# Patient Record
Sex: MALE | URBAN - METROPOLITAN AREA
[De-identification: names, ages, dates, MRNs, and addresses within clinical notes are randomized per-mention and may not be internally consistent; named-entity substitution may affect disease eponyms.]

---

## 2017-01-09 DIAGNOSIS — L40.0 PLAQUE PSORIASIS: Primary | ICD-10-CM

## 2017-01-17 ENCOUNTER — TELEPHONE (OUTPATIENT)
Dept: DERMATOLOGY | Facility: CLINIC | Age: 32
End: 2017-01-17

## 2017-01-20 ENCOUNTER — ALLIED HEALTH/NURSE VISIT (OUTPATIENT)
Dept: DERMATOLOGY | Facility: CLINIC | Age: 32
End: 2017-01-20

## 2017-01-20 DIAGNOSIS — L40.0 PLAQUE PSORIASIS: Primary | ICD-10-CM

## 2017-01-20 NOTE — MR AVS SNAPSHOT
After Visit Summary   2017    Luis M Roth    MRN: [id number]           Patient Information     Date Of Birth          1985        Visit Information        Provider Department      2017 12:15 PM Nurse,  Dermatology [facility] D[facility]         Follow-ups after your visit        Your next 10 appointments already scheduled     2017 12:15 PM   Nurse Visit with  Dermatology Nurse   [facility] ([facility])    [address] [place] [address] [phone]            2017  2:00 PM   (Arrive by 1:45 PM)   Return Visit with Paige Calderón PA-C   [facility] ([facility])    [address] [place] [address] [phone]              Who to contact     Please call your clinic at [phone] to:    Ask questions about your health    Make or cancel appointments    Discuss your medicines    Learn about your test results    Speak to your doctor   If you have compliments or concerns about an experience at your clinic, or if you wish to file a complaint, please contact [facility] Patient Relations at [phone] or email us at [email or web address]         Additional Information About Your Visit        MyChart Information     Tellpet is an electronic gateway that provides easy, online access to your medical records. With Game Face Hockey, you can request a clinic appointment, read your test results, renew a prescription or communicate with your care team.     To sign up for Tellpet visit the website at [email or web address]   You will be asked to enter the access code listed below, as well as some personal information. Please follow the directions to create your username and password.     Your access code is: [email or web address]  Expires: 3/26/2017  6:30 AM     Your access code will  in 90 days. If you need help or a  new code, please contact your [facility] or call [phone] for assistance.        Care EveryWhere ID     This is your Care EveryWhere ID. This could be used by other organizations to access your [place] medical records  [id number]         Blood Pressure from Last 3 Encounters:   No data found for BP    Weight from Last 3 Encounters:   08/05/15 75.297 kg (166 lb)   06/11/14 71.85 kg (158 lb 6.4 oz)   04/18/13 69.673 kg (153 lb 9.6 oz)              Today, you had the following     No orders found for display       Primary Care Provider    None Specified       No primary provider on file.        Thank you!     Thank you for choosing [facility] DERMATOLOGY  for your care. Our goal is always to provide you with excellent care. Hearing back from our patients is one way we can continue to improve our services. Please take a few minutes to complete the written survey that you may receive in the mail after your visit with us. Thank you!             Your Updated Medication List - Protect others around you: Learn how to safely use, store and throw away your medicines at www.disposemymeds.org.          This list is accurate as of: 1/20/17 12:04 PM.  Always use your most recent med list.                   Brand Name Dispense Instructions for use    clobetasol 0.05 % ointment    TEMOVATE    60 g    Apply topically 2 times daily On weekends.       ustekinumab 45 MG/0.5ML Sosy    STELARA    0.5 mL    Inject 0.5 mLs (45 mg) Subcutaneous once for 1 dose

## 2017-01-20 NOTE — NURSING NOTE
Dermatology Rooming Note    Luis M Roth's goals for this visit include:   Chief Complaint   Patient presents with     Derm Problem     Psoriasis. Stelara injection     Elizabet Centeno, CMA

## 2017-04-20 ENCOUNTER — OFFICE VISIT (OUTPATIENT)
Dept: DERMATOLOGY | Facility: CLINIC | Age: 32
End: 2017-04-20

## 2017-04-20 DIAGNOSIS — L40.0 PLAQUE PSORIASIS: ICD-10-CM

## 2017-04-20 ASSESSMENT — PAIN SCALES - GENERAL: PAINLEVEL: NO PAIN (0)

## 2017-04-20 NOTE — LETTER
4/20/2017       RE: Luis M Roth  [address] [place] [address]     Dear Colleague,    Thank you for referring your patient, Luis M Roth, to the [facility] DERMATOLOGY at [facility]. Please see a copy of my visit note below.    [facility] Dermatology Note    Dermatology Problem List:  1. Plaque psoriasis  -s/p Stelara 45 mg  -s/p Clobetasol 0.05% ointment    CC:   Chief Complaint   Patient presents with     Derm Problem     Patient states he is here for an injection for stelera and a follow up on psoriasis.       Date of Service: Apr 20, 2017    History of Present Illness:  Mr. Luis M Roth is a 31 year old male who presents for a follow up for his psoriasis. The patient was last seen here January 2017.  He states he is doing well but does notice 1-2 weeks prior to his injection he breaks out with more spots. He notes that the Stelara injection has helped a lot. He states that the Clobetasol 0.05% ointment also helps the plaques but has not used this recently. He had left wrist pain for 1-2 weeks but it resolved on its own. The patient reports no other lesions of concern at this time.    Otherwise, the patient reports no painful, bleeding, nonhealing, or pruritic lesions, and denies new or changing moles.    Past Medical History:   Patient Active Problem List   Diagnosis     Psoriasis       History reviewed. No pertinent past medical history.    History reviewed. No pertinent surgical history.    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    Family History:  Not addressed at this visit.    Medications:  Current Outpatient Prescriptions   Medication Sig Dispense Refill     ustekinumab (STELARA) 45 MG/0.5ML SOSY Inject 0.5 mLs (45 mg) Subcutaneous once for 1 dose 0.5 mL 0     clobetasol (TEMOVATE) 0.05 % ointment Apply topically 2 times daily On weekends. 60 g 3     [DISCONTINUED] ustekinumab  (STELARA) 45 MG/0.5ML SOSY Inject 0.5 mLs (45 mg) Subcutaneous once for 1 dose 0.5 mL 0       Allergies:  Allergies   Allergen Reactions     No Known Drug Allergy        Review of Systems:  Denies headaches, blurred vision, confusion, fever, chronic cough, arthralgias, shortness of breath, numbness, abdominal pain or diarrhea, hematochezia. Is feeling well otherwise, no other skin complaints.      Physical exam:  Vitals: There were no vitals taken for this visit.  GEN: This is a well developed, well-nourished male in no acute distress, in a pleasant mood.    No cervical LAD.   SKIN: Focused examination of the scalp, face, neck, forearms, hands, nails, lower abdomen. Defers further examination.  - Thick psoriatic plaques with micaceous scale right frontal scalp, right side burn,left volar forearm  - No other lesions of concern on areas examined.     Impression/Plan:  1. Plaque psoriasis, slightly worse for the past few months due to missed Stelara injection due to insurance issues.  - Stelara 45 mg subcutaneously every 3 months (75 kg male).   - Previous blood work wnl. (10 /2016)Blood work today, CBC, CMP, TB. These were within normal limits (and TB negative). Recommend blood work including M tuberculosis by Quantiferon annually.   - If needed, Continue clobetasol 0.05% ointment - apply twice daily for two weeks, then bid on weekends.   -Injection given in office today by nursing staff.         Follow-up in 3 months, earlier for new or changing lesions.       Staff Involved:  Staff Only    All risks, benefits and alternatives were discussed with patient.  Patient is in agreement and understands the assessment and plan.  All questions were answered.  Sun Screen Education was given.   Return to Clinic in 3 months or sooner as needed.   Paige Calderón PA-C             Again, thank you for allowing me to participate in the care of your patient.      Sincerely,    Paige Calderón PA-C

## 2017-04-20 NOTE — PROGRESS NOTES
[facility] Dermatology Note    Dermatology Problem List:  1. Plaque psoriasis  -s/p Stelara 45 mg  -s/p Clobetasol 0.05% ointment    CC:   Chief Complaint   Patient presents with     Derm Problem     Patient states he is here for an injection for stelera and a follow up on psoriasis.       Date of Service: Apr 20, 2017    History of Present Illness:  Mr. Luis M Roth is a 31 year old male who presents for a follow up for his psoriasis. The patient was last seen here January 2017.  He states he is doing well but does notice 1-2 weeks prior to his injection he breaks out with more spots. He notes that the Stelara injection has helped a lot. He states that the Clobetasol 0.05% ointment also helps the plaques but has not used this recently. He had left wrist pain for 1-2 weeks but it resolved on its own. The patient reports no other lesions of concern at this time.    Otherwise, the patient reports no painful, bleeding, nonhealing, or pruritic lesions, and denies new or changing moles.    Past Medical History:   Patient Active Problem List   Diagnosis     Psoriasis       History reviewed. No pertinent past medical history.    History reviewed. No pertinent surgical history.    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    Family History:  Not addressed at this visit.    Medications:  Current Outpatient Prescriptions   Medication Sig Dispense Refill     ustekinumab (STELARA) 45 MG/0.5ML SOSY Inject 0.5 mLs (45 mg) Subcutaneous once for 1 dose 0.5 mL 0     clobetasol (TEMOVATE) 0.05 % ointment Apply topically 2 times daily On weekends. 60 g 3     [DISCONTINUED] ustekinumab (STELARA) 45 MG/0.5ML SOSY Inject 0.5 mLs (45 mg) Subcutaneous once for 1 dose 0.5 mL 0       Allergies:  Allergies   Allergen Reactions     No Known Drug Allergy        Review of Systems:  Denies headaches, blurred vision, confusion, fever, chronic cough, arthralgias, shortness of  breath, numbness, abdominal pain or diarrhea, hematochezia. Is feeling well otherwise, no other skin complaints.      Physical exam:  Vitals: There were no vitals taken for this visit.  GEN: This is a well developed, well-nourished male in no acute distress, in a pleasant mood.    No cervical LAD.   SKIN: Focused examination of the scalp, face, neck, forearms, hands, nails, lower abdomen. Defers further examination.  - Thick psoriatic plaques with micaceous scale right frontal scalp, right side burn,left volar forearm  - No other lesions of concern on areas examined.     Impression/Plan:  1. Plaque psoriasis, slightly worse for the past few months due to missed Stelara injection due to insurance issues.  - Stelara 45 mg subcutaneously every 3 months (75 kg male).   - Previous blood work wnl. (10 /2016)Blood work today, CBC, CMP, TB. These were within normal limits (and TB negative). Recommend blood work including M tuberculosis by Quantiferon annually.   - If needed, Continue clobetasol 0.05% ointment - apply twice daily for two weeks, then bid on weekends.   -Injection given in office today by nursing staff.         Follow-up in 3 months, earlier for new or changing lesions.       Staff Involved:  Staff Only    All risks, benefits and alternatives were discussed with patient.  Patient is in agreement and understands the assessment and plan.  All questions were answered.  Sun Screen Education was given.   Return to Clinic in 3 months or sooner as needed.   Paige Calderón PA-C

## 2017-04-20 NOTE — MR AVS SNAPSHOT
After Visit Summary   4/20/2017    Luis M Roth    MRN: [id number]           Patient Information     Date Of Birth          1985        Visit Information        Provider Department      4/20/2017 2:00 PM Paige Calderón PA-C [facility] Dermatology        Today's Diagnoses     Plaque psoriasis           Follow-ups after your visit        Follow-up notes from your care team     Return in about 3 months (around 7/20/2017).      Your next 10 appointments already scheduled     Jul 20, 2017  2:00 PM CDT   (Arrive by 1:45 PM)   Return Visit with COURTNEY Childers [facility] Dermatology ([facility])    [address] [address] [phone]              Who to contact     Please call your clinic at [phone] to:    Ask questions about your health    Make or cancel appointments    Discuss your medicines    Learn about your test results    Speak to your doctor   If you have compliments or concerns about an experience at your clinic, or if you wish to file a complaint, please contact [facility] Patient Relations at [phone] or email us at [email or web address]         Additional Information About Your Visit        MyChart Information     Pix4Dt gives you secure access to your electronic health record. If you see a primary care provider, you can also send messages to your care team and make appointments. If you have questions, please call your primary care clinic.  If you do not have a primary care provider, please call [phone] and they will assist you.      Acacia Living is an electronic gateway that provides easy, online access to your medical records. With Acacia Living, you can request a clinic appointment, read your test results, renew a prescription or communicate with your care team.     To access your existing account, please contact your [facility] or call  [phone] for assistance.        Care EveryWhere ID     This is your Care EveryWhere ID. This could be used by other organizations to access your [place] medical records  [id number]         Blood Pressure from Last 3 Encounters:   No data found for BP    Weight from Last 3 Encounters:   08/05/15 75.3 kg (166 lb)   06/11/14 71.8 kg (158 lb 6.4 oz)   04/18/13 69.7 kg (153 lb 9.6 oz)              Today, you had the following     No orders found for display         Today's Medication Changes          These changes are accurate as of: 4/20/17  2:29 PM.  If you have any questions, ask your nurse or doctor.               Start taking these medicines.        Dose/Directions    ustekinumab 45 MG/0.5ML Sosy   Commonly known as:  STELARA   Used for:  Plaque psoriasis   Started by:  Paige Calderón PA-C        Dose:  45 mg   Inject 0.5 mLs (45 mg) Subcutaneous once for 1 dose   Quantity:  0.5 mL   Refills:  0            Where to get your medicines      Some of these will need a paper prescription and others can be bought over the counter.  Ask your nurse if you have questions.     Bring a paper prescription for each of these medications     ustekinumab 45 MG/0.5ML Sosy                Primary Care Provider    None Specified       No primary provider on file.        Thank you!     Thank you for choosing [facility] DERMATOLOGY  for your care. Our goal is always to provide you with excellent care. Hearing back from our patients is one way we can continue to improve our services. Please take a few minutes to complete the written survey that you may receive in the mail after your visit with us. Thank you!             Your Updated Medication List - Protect others around you: Learn how to safely use, store and throw away your medicines at www.disposemymeds.org.          This list is accurate as of: 4/20/17  2:29 PM.  Always use your most recent med list.                   Brand Name Dispense Instructions for use     clobetasol 0.05 % ointment    TEMOVATE    60 g    Apply topically 2 times daily On weekends.       ustekinumab 45 MG/0.5ML Sosy    STELARA    0.5 mL    Inject 0.5 mLs (45 mg) Subcutaneous once for 1 dose

## 2017-04-20 NOTE — NURSING NOTE
Dermatology Rooming Note    Luis M Roth's goals for this visit include:   Chief Complaint   Patient presents with     Derm Problem     Patient states he is here for an injection for stelera and a follow up on psoriasis.     Meri Loera CMA

## 2017-04-20 NOTE — LETTER
Date:April 21, 2017      Patient was self referred, no letter generated. Do not send.        [facility] Health Information

## 2017-04-21 ENCOUNTER — TELEPHONE (OUTPATIENT)
Dept: DERMATOLOGY | Facility: CLINIC | Age: 32
End: 2017-04-21

## 2017-04-21 NOTE — TELEPHONE ENCOUNTER
[facility] Prior Authorization Team   Phone: [phone]  Fax: [phone]      PA Initiation    Medication: Stelara  Insurance Company: [employer] - Phone [phone] Fax [phone]  Pharmacy Filling the Rx: [facility] - [place], [place] - [address]  Filling Pharmacy Phone: [phone]  Filling Pharmacy Fax: [phone]  Start Date: 4/21/2017

## 2017-04-24 NOTE — TELEPHONE ENCOUNTER
Please see the additional information request I received, please provide documentation that patient quarterly visits for injections are a hardship.

## 2017-07-05 ENCOUNTER — ALLIED HEALTH/NURSE VISIT (OUTPATIENT)
Dept: DERMATOLOGY | Facility: CLINIC | Age: 32
End: 2017-07-05

## 2017-07-05 VITALS — SYSTOLIC BLOOD PRESSURE: 107 MMHG | HEART RATE: 70 BPM | DIASTOLIC BLOOD PRESSURE: 70 MMHG | TEMPERATURE: 97.6 F

## 2017-07-05 DIAGNOSIS — L40.0 PLAQUE PSORIASIS: ICD-10-CM

## 2017-07-05 NOTE — MR AVS SNAPSHOT
After Visit Summary   7/5/2017    Luis M Roth    MRN: [id number]           Patient Information     Date Of Birth          1985        Visit Information        Provider Department      7/5/2017 2:30 PM Nurse,  Dermatology [facility] Dermatology        Today's Diagnoses     Plaque psoriasis           Follow-ups after your visit        Who to contact     Please call your clinic at [phone] to:    Ask questions about your health    Make or cancel appointments    Discuss your medicines    Learn about your test results    Speak to your doctor   If you have compliments or concerns about an experience at your clinic, or if you wish to file a complaint, please contact [facility] Patient Relations at [phone] or email us at [email or web address]         Additional Information About Your Visit        MyChart Information     PowerUp Toyst gives you secure access to your electronic health record. If you see a primary care provider, you can also send messages to your care team and make appointments. If you have questions, please call your primary care clinic.  If you do not have a primary care provider, please call [phone] and they will assist you.      Hooptap is an electronic gateway that provides easy, online access to your medical records. With Hooptap, you can request a clinic appointment, read your test results, renew a prescription or communicate with your care team.     To access your existing account, please contact your [facility] or call [phone] for assistance.        Care EveryWhere ID     This is your Care EveryWhere ID. This could be used by other organizations to access your [place] medical records  [id number]        Your Vitals Were     Pulse Temperature                70 97.6  F (36.4  C) (Oral)           Blood Pressure from Last 3 Encounters:   07/05/17 107/70    Weight from Last 3 Encounters:   08/05/15 75.3  kg (166 lb)   06/11/14 71.8 kg (158 lb 6.4 oz)   04/18/13 69.7 kg (153 lb 9.6 oz)              Today, you had the following     No orders found for display         Today's Medication Changes          These changes are accurate as of: 7/5/17 11:59 PM.  If you have any questions, ask your nurse or doctor.               Start taking these medicines.        Dose/Directions    ustekinumab 45 MG/0.5ML Sosy   Commonly known as:  STELARA   Used for:  Plaque psoriasis        Dose:  45 mg   Inject 0.5 mLs (45 mg) Subcutaneous once for 1 dose   Quantity:  0.5 mL   Refills:  0            Where to get your medicines      Some of these will need a paper prescription and others can be bought over the counter.  Ask your nurse if you have questions.     Bring a paper prescription for each of these medications     ustekinumab 45 MG/0.5ML Sosy                Primary Care Provider    None Specified       No primary provider on file.        Equal Access to Services     [facility] : Haley Noel, julia hernandez, nilton box, tien winter . So [facility] [phone].    ATENCIÓN: Si habla español, tiene a willams disposición servicios gratuitos de asistencia lingüística. Llame al [phone].    We comply with applicable federal civil rights laws and [place] laws. We do not discriminate on the basis of race, color, national origin, age, disability sex, sexual orientation or gender identity.            Thank you!     Thank you for choosing [facility] DERMATOLOGY  for your care. Our goal is always to provide you with excellent care. Hearing back from our patients is one way we can continue to improve our services. Please take a few minutes to complete the written survey that you may receive in the mail after your visit with us. Thank you!             Your Updated Medication List - Protect others around you: Learn how to safely use, store and throw away your medicines at  www.disposemymeds.org.          This list is accurate as of: 7/5/17 11:59 PM.  Always use your most recent med list.                   Brand Name Dispense Instructions for use Diagnosis    clobetasol 0.05 % ointment    TEMOVATE    60 g    Apply topically 2 times daily On weekends.    Plaque psoriasis       ustekinumab 45 MG/0.5ML Sosy    STELARA    0.5 mL    Inject 0.5 mLs (45 mg) Subcutaneous once for 1 dose    Plaque psoriasis

## 2017-07-05 NOTE — PROGRESS NOTES
Patient presents for Stelara injection, to be given every 3 months with last dose 4/17. Quant gold 10/16 NEG. Patient denies fever or feeling ill, VS WNL. Injection given in LUQ. Patient tolerated.

## 2017-11-06 ENCOUNTER — ALLIED HEALTH/NURSE VISIT (OUTPATIENT)
Dept: DERMATOLOGY | Facility: CLINIC | Age: 32
End: 2017-11-06

## 2017-11-06 DIAGNOSIS — L40.9 PSORIASIS: ICD-10-CM

## 2017-11-06 DIAGNOSIS — Z53.9 ERRONEOUS ENCOUNTER--DISREGARD: ICD-10-CM

## 2017-11-06 DIAGNOSIS — L40.9 PSORIASIS: Primary | ICD-10-CM

## 2017-11-06 LAB
ALBUMIN SERPL-MCNC: 4.3 G/DL (ref 3.4–5)
ALP SERPL-CCNC: 49 U/L (ref 40–150)
ALT SERPL W P-5'-P-CCNC: 48 U/L (ref 0–70)
ANION GAP SERPL CALCULATED.3IONS-SCNC: 7 MMOL/L (ref 3–14)
AST SERPL W P-5'-P-CCNC: 30 U/L (ref 0–45)
BASOPHILS # BLD AUTO: 0.1 10E9/L (ref 0–0.2)
BASOPHILS NFR BLD AUTO: 0.6 %
BILIRUB SERPL-MCNC: 0.4 MG/DL (ref 0.2–1.3)
BUN SERPL-MCNC: 19 MG/DL (ref 7–30)
CALCIUM SERPL-MCNC: 8.5 MG/DL (ref 8.5–10.1)
CHLORIDE SERPL-SCNC: 108 MMOL/L (ref 94–109)
CO2 SERPL-SCNC: 24 MMOL/L (ref 20–32)
CREAT SERPL-MCNC: 0.83 MG/DL (ref 0.66–1.25)
DIFFERENTIAL METHOD BLD: NORMAL
EOSINOPHIL # BLD AUTO: 0.2 10E9/L (ref 0–0.7)
EOSINOPHIL NFR BLD AUTO: 2.3 %
ERYTHROCYTE [DISTWIDTH] IN BLOOD BY AUTOMATED COUNT: 12.2 % (ref 10–15)
GFR SERPL CREATININE-BSD FRML MDRD: >90 ML/MIN/1.7M2
GLUCOSE SERPL-MCNC: 92 MG/DL (ref 70–99)
HCT VFR BLD AUTO: 43.4 % (ref 40–53)
HGB BLD-MCNC: 14.7 G/DL (ref 13.3–17.7)
IMM GRANULOCYTES # BLD: 0 10E9/L (ref 0–0.4)
IMM GRANULOCYTES NFR BLD: 0.4 %
LYMPHOCYTES # BLD AUTO: 2 10E9/L (ref 0.8–5.3)
LYMPHOCYTES NFR BLD AUTO: 25.8 %
MCH RBC QN AUTO: 28.9 PG (ref 26.5–33)
MCHC RBC AUTO-ENTMCNC: 33.9 G/DL (ref 31.5–36.5)
MCV RBC AUTO: 85 FL (ref 78–100)
MONOCYTES # BLD AUTO: 0.6 10E9/L (ref 0–1.3)
MONOCYTES NFR BLD AUTO: 8.2 %
NEUTROPHILS # BLD AUTO: 4.9 10E9/L (ref 1.6–8.3)
NEUTROPHILS NFR BLD AUTO: 62.7 %
NRBC # BLD AUTO: 0 10*3/UL
NRBC BLD AUTO-RTO: 0 /100
PLATELET # BLD AUTO: 204 10E9/L (ref 150–450)
POTASSIUM SERPL-SCNC: 3.7 MMOL/L (ref 3.4–5.3)
PROT SERPL-MCNC: 7.3 G/DL (ref 6.8–8.8)
RBC # BLD AUTO: 5.08 10E12/L (ref 4.4–5.9)
SODIUM SERPL-SCNC: 139 MMOL/L (ref 133–144)
WBC # BLD AUTO: 7.8 10E9/L (ref 4–11)

## 2017-11-06 NOTE — MR AVS SNAPSHOT
After Visit Summary   11/6/2017    Luis M Roth    MRN: [id number]           Patient Information     Date Of Birth          1985        Visit Information        Provider Department      11/6/2017 1:00 PM Nurse, [facility] Dermatology        Today's Diagnoses     Psoriasis    -  1    ERRONEOUS ENCOUNTER--DISREGARD           Follow-ups after your visit        Your next 10 appointments already scheduled     Feb 07, 2018  3:00 PM CST   (Arrive by 2:45 PM)   Return Visit with Paige Calderón PA-C   [facility] Dermatology ([facility] [place])    [address] [place] [id number] [phone]              Who to contact     Please call your clinic at [phone] to:    Ask questions about your health    Make or cancel appointments    Discuss your medicines    Learn about your test results    Speak to your doctor   If you have compliments or concerns about an experience at your clinic, or if you wish to file a complaint, please contact [facility] Patient Relations at [phone] or email us at [email or web address]         Additional Information About Your Visit        MyChart Information     [employer] gives you secure access to your electronic health record. If you see a primary care provider, you can also send messages to your care team and make appointments. If you have questions, please call your primary care clinic.  If you do not have a primary care provider, please call [phone] and they will assist you.      [employer] is an electronic gateway that provides easy, online access to your medical records. With [employer], you can request a clinic appointment, read your test results, renew a prescription or communicate with your care team.     To access your existing account, please contact your [facility] or call [phone] for assistance.        Care EveryWhere ID      This is your Care EveryWhere ID. This could be used by other organizations to access your [place] medical records  [id number]         Blood Pressure from Last 3 Encounters:   11/10/17 127/72   07/05/17 107/70    Weight from Last 3 Encounters:   08/05/15 75.3 kg (166 lb)   06/11/14 71.8 kg (158 lb 6.4 oz)   04/18/13 69.7 kg (153 lb 9.6 oz)               Primary Care Provider Fax #    Physician No Ref-Primary [phone]       No address on file        Equal Access to Services     [facility]: Hadii harriet marino Sofred, waaxda luqadaha, qaybta kaalmada charu, tien winter . So [facility] [phone].    ATENCIÓN: Si habla español, tiene a willams disposición servicios gratuitos de asistencia lingüística. Llame al [phone].    We comply with applicable federal civil rights laws and [place] laws. We do not discriminate on the basis of race, color, national origin, age, disability, sex, sexual orientation, or gender identity.            Thank you!     Thank you for choosing [facility] DERMATOLOGY  for your care. Our goal is always to provide you with excellent care. Hearing back from our patients is one way we can continue to improve our services. Please take a few minutes to complete the written survey that you may receive in the mail after your visit with us. Thank you!             Your Updated Medication List - Protect others around you: Learn how to safely use, store and throw away your medicines at www.disposemymeds.org.          This list is accurate as of: 11/6/17 11:59 PM.  Always use your most recent med list.                   Brand Name Dispense Instructions for use Diagnosis    clobetasol 0.05 % ointment    TEMOVATE    60 g    Apply topically 2 times daily On weekends.    Plaque psoriasis

## 2017-11-08 DIAGNOSIS — L40.0 PLAQUE PSORIASIS: ICD-10-CM

## 2017-11-08 LAB
M TB TUBERC IFN-G BLD QL: NEGATIVE
M TB TUBERC IFN-G/MITOGEN IGNF BLD: 0.02 IU/ML

## 2017-11-10 ENCOUNTER — ALLIED HEALTH/NURSE VISIT (OUTPATIENT)
Dept: DERMATOLOGY | Facility: CLINIC | Age: 32
End: 2017-11-10

## 2017-11-10 VITALS — SYSTOLIC BLOOD PRESSURE: 127 MMHG | TEMPERATURE: 97.6 F | DIASTOLIC BLOOD PRESSURE: 72 MMHG

## 2017-11-10 DIAGNOSIS — L40.0 PLAQUE PSORIASIS: Primary | ICD-10-CM

## 2017-11-10 NOTE — NURSING NOTE
This service provided today was under the supervising provider of the day Dr Williamson, who was available if needed.    Reason for visit: Luis M Roth comes into clinic today at the request of Paige Calderón PA-C Ordering Provider for Stelara injection. Injection to be given every 3 months with last dose 7/17. Quant gold 11/17 NEG. Patient denies fever or feeling ill, VS WNL. Injection given in RUQ. Patient tolerated injection. Understands injection site reactions to watch for. Agrees to contact clinic with any concerns.    Shira Urena RN

## 2017-11-10 NOTE — MR AVS SNAPSHOT
After Visit Summary   11/10/2017    LuisM Roth    MRN: [id number]           Patient Information     Date Of Birth          1985        Visit Information        Provider Department      11/10/2017 11:45 AM Nurse, [place] [facility] Dermatology        Today's Diagnoses     Plaque psoriasis    -  1       Follow-ups after your visit        Your next 10 appointments already scheduled     Feb 07, 2018  3:00 PM CST   (Arrive by 2:45 PM)   Return Visit with Paige Calderón PA-C   [facility] Dermatology ([facility] [place])    [address] [place] [id number] [phone]              Who to contact     Please call your clinic at [phone] to:    Ask questions about your health    Make or cancel appointments    Discuss your medicines    Learn about your test results    Speak to your doctor   If you have compliments or concerns about an experience at your clinic, or if you wish to file a complaint, please contact [facility] Patient Relations at [phone] or email us at [email or web address]         Additional Information About Your Visit        MyChart Information     [employer] gives you secure access to your electronic health record. If you see a primary care provider, you can also send messages to your care team and make appointments. If you have questions, please call your primary care clinic.  If you do not have a primary care provider, please call [phone] and they will assist you.      [employer] is an electronic gateway that provides easy, online access to your medical records. With [employer], you can request a clinic appointment, read your test results, renew a prescription or communicate with your care team.     To access your existing account, please contact your [facility] or call [phone] for assistance.        Care EveryWhere ID     This is your Care EveryWhere  ID. This could be used by other organizations to access your [place] medical records  [id number]        Your Vitals Were     Temperature                   97.6  F (36.4  C)            Blood Pressure from Last 3 Encounters:   11/10/17 127/72   07/05/17 107/70    Weight from Last 3 Encounters:   08/05/15 75.3 kg (166 lb)   06/11/14 71.8 kg (158 lb 6.4 oz)   04/18/13 69.7 kg (153 lb 9.6 oz)              Today, you had the following     No orders found for display       Primary Care Provider    Physician No Ref-Primary       NO REF-PRIMARY PHYSICIAN        Equal Access to Services     [facility]: Hadii aad ku jamila Sofred, wajoseda natashaadaha, qaybta kaalmada charu, tien winter . So [facility] [phone].    ATENCIÓN: Si habla español, tiene a willams disposición servicios gratuitos de asistencia lingüística. Llame al [phone].    We comply with applicable federal civil rights laws and [place] laws. We do not discriminate on the basis of race, color, national origin, age, disability, sex, sexual orientation, or gender identity.            Thank you!     Thank you for choosing [facility] DERMATOLOGY  for your care. Our goal is always to provide you with excellent care. Hearing back from our patients is one way we can continue to improve our services. Please take a few minutes to complete the written survey that you may receive in the mail after your visit with us. Thank you!             Your Updated Medication List - Protect others around you: Learn how to safely use, store and throw away your medicines at www.disposemymeds.org.          This list is accurate as of: 11/10/17  2:05 PM.  Always use your most recent med list.                   Brand Name Dispense Instructions for use Diagnosis    clobetasol 0.05 % ointment    TEMOVATE    60 g    Apply topically 2 times daily On weekends.    Plaque psoriasis

## 2018-02-07 ENCOUNTER — TELEPHONE (OUTPATIENT)
Dept: DERMATOLOGY | Facility: CLINIC | Age: 33
End: 2018-02-07

## 2018-02-07 ENCOUNTER — OFFICE VISIT (OUTPATIENT)
Dept: DERMATOLOGY | Facility: CLINIC | Age: 33
End: 2018-02-07
Payer: COMMERCIAL

## 2018-02-07 DIAGNOSIS — L40.9 PSORIASIS: Primary | ICD-10-CM

## 2018-02-07 ASSESSMENT — PAIN SCALES - GENERAL: PAINLEVEL: NO PAIN (0)

## 2018-02-07 NOTE — NURSING NOTE
"Dermatology Rooming Note    Luis M Roth's goals for this visit include:   Chief Complaint   Patient presents with     Derm Problem     Psoriasis - Luis M notes that his skin is doing \"okay.\"     Elizabet Centeno, MERLE  "

## 2018-02-07 NOTE — LETTER
Date:February 8, 2018      Patient was self referred, no letter generated. Do not send.        [facility] Health Information

## 2018-02-07 NOTE — MR AVS SNAPSHOT
After Visit Summary   2/7/2018    Luis M Roth    MRN: [id number]           Patient Information     Date Of Birth          1985        Visit Information        Provider Department      2/7/2018 3:00 PM Paige Calderón PA-C [facility] Dermatology        Today's Diagnoses     Psoriasis    -  1       Follow-ups after your visit        Follow-up notes from your care team     Return in about 3 months (around 5/7/2018).      Who to contact     Please call your clinic at [phone] to:    Ask questions about your health    Make or cancel appointments    Discuss your medicines    Learn about your test results    Speak to your doctor   If you have compliments or concerns about an experience at your clinic, or if you wish to file a complaint, please contact [facility] Patient Relations at [phone] or email us at [email or web address]         Additional Information About Your Visit        MyChart Information     [employer] gives you secure access to your electronic health record. If you see a primary care provider, you can also send messages to your care team and make appointments. If you have questions, please call your primary care clinic.  If you do not have a primary care provider, please call [phone] and they will assist you.      [employer] is an electronic gateway that provides easy, online access to your medical records. With [employer], you can request a clinic appointment, read your test results, renew a prescription or communicate with your care team.     To access your existing account, please contact your [facility] or call [phone] for assistance.        Care EveryWhere ID     This is your Care EveryWhere ID. This could be used by other organizations to access your [place] medical records  [id number]         Blood Pressure from Last 3 Encounters:   11/10/17 127/72   07/05/17 107/70    Weight from Last 3  Encounters:   08/05/15 75.3 kg (166 lb)   06/11/14 71.8 kg (158 lb 6.4 oz)   04/18/13 69.7 kg (153 lb 9.6 oz)              Today, you had the following     No orders found for display         Today's Medication Changes          These changes are accurate as of 2/7/18  3:17 PM.  If you have any questions, ask your nurse or doctor.               Start taking these medicines.        Dose/Directions    adalimumab 40 MG/0.8ML prefilled syringe kit   Commonly known as:  HUMIRA   Used for:  Psoriasis   Started by:  Paige Calderón PA-C        Inject 80 mg subQ on day 0 and then on day 8 start 40 mg every other week. Dispense starter kit.   Quantity:  3 Syringe   Refills:  0            Where to get your medicines      Call your pharmacy to confirm that your medication is ready for pickup. It may take up to 24 hours for them to receive the prescription. If the prescription is not ready within 3 business days, please contact your clinic or your provider.     We will let you know when these medications are ready. If you don't hear back within 3 business days, please contact us.     adalimumab 40 MG/0.8ML prefilled syringe kit                Primary Care Provider Fax #    Physician No Ref-Primary [phone]       No address on file        Equal Access to Services     [facility] : Haley Noel, waaxda lucarolianadaha, qaybta kaalmada adeleann, tien puckett. So [facility] [phone].    ATENCIÓN: Si habla español, tiene a willams disposición servicios gratuitos de asistencia lingüística. Llame al [phone].    We comply with applicable federal civil rights laws and [place] laws. We do not discriminate on the basis of race, color, national origin, age, disability, sex, sexual orientation, or gender identity.            Thank you!     Thank you for choosing [facility] DERMATOLOGY  for your care. Our goal is always to provide you with excellent care. Hearing back from our patients is one  way we can continue to improve our services. Please take a few minutes to complete the written survey that you may receive in the mail after your visit with us. Thank you!             Your Updated Medication List - Protect others around you: Learn how to safely use, store and throw away your medicines at www.disposemymeds.org.          This list is accurate as of 2/7/18  3:17 PM.  Always use your most recent med list.                   Brand Name Dispense Instructions for use Diagnosis    adalimumab 40 MG/0.8ML prefilled syringe kit    HUMIRA    3 Syringe    Inject 80 mg subQ on day 0 and then on day 8 start 40 mg every other week. Dispense starter kit.    Psoriasis       clobetasol 0.05 % ointment    TEMOVATE    60 g    Apply topically 2 times daily On weekends.    Plaque psoriasis

## 2018-02-07 NOTE — PROGRESS NOTES
"[facility] Dermat[facility] Note    Dermatology Problem List:  1. Plaque psoriasis  - s/p cyclosporine, light treatment, topical steroid medications, Stelara 45 mg  - clobetasol 0.05% ointment, ketoconazole 2% shampoo, Humira    CC:   Chief Complaint   Patient presents with     Derm Problem     Psoriasis - Luis M notes that his skin is doing \"okay.\"     Date of Service: Feb 7, 2018    History of Present Illness:  Mr. Luis M Roth is a 32 year old male who presents for a follow up for his psoriasis. The patient was last seen on 4/20/17 when he continued Stelara and clobetasol 0.05% ointment for psoriasis. Today the patient reports that his skin is doing okay. He feels that his skin is fighting the injections as there are some areas that have not disappeared. His problem areas are on his abdomen and lower back. He states that he uses ketoconazole 2% shampoo. He denies any joint paints.    He notes that he has tried cyclosporine, light treatment, and topical steroid medications in the past with no improvement. For injectable medications, he has only tried Stelara. The patient reports no other lesions of concern at this time.    Otherwise, the patient reports no painful, bleeding, nonhealing, or pruritic lesions, and denies new or changing moles.    Past Medical History:   Patient Active Problem List   Diagnosis     Psoriasis     No past medical history on file.    No past surgical history on file.    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    Family History:  Not addressed at this visit.    Medications:  Current Outpatient Prescriptions   Medication Sig Dispense Refill     clobetasol (TEMOVATE) 0.05 % ointment Apply topically 2 times daily On weekends. 60 g 0     Allergies:  Allergies   Allergen Reactions     No Known Drug Allergy      Review of Systems:  - Denies headaches, blurred vision, confusion, fever, chronic cough, arthralgias, shortness of " breath, numbness, abdominal pain or diarrhea, hematochezia. Is feeling well otherwise, no other skin complaints.    Physical exam:  Vitals: There were no vitals taken for this visit.  GEN: This is a well developed, well-nourished male in no acute distress, in a pleasant mood.      SKIN: Sun-exposed skin, which includes the head/face, neck, both arms, digits, and/or nails was examined.   - Pitting on most nails  - Psoriatic plaques on lower back, scattered to abdomen, few to extremities, most of scalp, and few to forehead  - No other lesions of concern on areas examined.     Impression/Plan:  1. Plaque psoriasis, slightly worse for the past few months due to missed Stelara injection due to insurance issues.  - Discussion of other treatment options including Humira.  - Stop Stelara 45 mg subcutaneously every 3 months.   - Start Humira. First week double dose, Second week start every other week. Reviewed biologics and potential side effects.   - Labs were obtained in Fall 2017, wnl.  - If needed, continue clobetasol 0.05% ointment - apply twice daily for two weeks, then bid on weekends.     Follow-up in 3 months, earlier for new or changing lesions.     Staff Involved:  Staff Only  Scribe Disclosure:   I, Geraldine Horne, am serving as a scribe to document services personally performed by Paige Calderón PA-C, based on data collection and the provider's statements to me.  Provider Disclosure:   The documentation recorded by the scribe accurately reflects the services I personally performed and the decisions made by me.    All risks, benefits and alternatives were discussed with patient.  Patient is in agreement and understands the assessment and plan.  All questions were answered.  Sun Screen Education was given.   Return to Clinic in 3 months or sooner as needed.   Paige Calderón PA-C   [facility]

## 2018-02-07 NOTE — LETTER
"2/7/2018       RE: Luis M Roth  [address]  [place] [address]     Dear Colleague,    Thank you for referring your patient, Luis M Roth, to the [facility] DERMATOLOGY at [facility]. Please see a copy of my visit note below.    [facility] Dermatology Note    Dermatology Problem List:  1. Plaque psoriasis  - s/p cyclosporine, light treatment, topical steroid medications, Stelara 45 mg  - clobetasol 0.05% ointment, ketoconazole 2% shampoo, Humira    CC:   Chief Complaint   Patient presents with     Derm Problem     Psoriasis - Luis M notes that his skin is doing \"okay.\"     Date of Service: Feb 7, 2018    History of Present Illness:  Mr. Luis M Roth is a 32 year old male who presents for a follow up for his psoriasis. The patient was last seen on 4/20/17 when he continued Stelara and clobetasol 0.05% ointment for psoriasis. Today the patient reports that his skin is doing okay. He feels that his skin is fighting the injections as there are some areas that have not disappeared. His problem areas are on his abdomen and lower back. He states that he uses ketoconazole 2% shampoo. He denies any joint paints.    He notes that he has tried cyclosporine, light treatment, and topical steroid medications in the past with no improvement. For injectable medications, he has only tried Stelara. The patient reports no other lesions of concern at this time.    Otherwise, the patient reports no painful, bleeding, nonhealing, or pruritic lesions, and denies new or changing moles.    Past Medical History:   Patient Active Problem List   Diagnosis     Psoriasis     No past medical history on file.    No past surgical history on file.    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    Family History:  Not addressed at this visit.    Medications:  Current Outpatient Prescriptions   Medication Sig Dispense Refill     " clobetasol (TEMOVATE) 0.05 % ointment Apply topically 2 times daily On weekends. 60 g 0     Allergies:  Allergies   Allergen Reactions     No Known Drug Allergy      Review of Systems:  - Denies headaches, blurred vision, confusion, fever, chronic cough, arthralgias, shortness of breath, numbness, abdominal pain or diarrhea, hematochezia. Is feeling well otherwise, no other skin complaints.    Physical exam:  Vitals: There were no vitals taken for this visit.  GEN: This is a well developed, well-nourished male in no acute distress, in a pleasant mood.      SKIN: Sun-exposed skin, which includes the head/face, neck, both arms, digits, and/or nails was examined.   - Pitting on most nails  - Psoriatic plaques on lower back, scattered to abdomen, few to extremities, most of scalp, and few to forehead  - No other lesions of concern on areas examined.     Impression/Plan:  1. Plaque psoriasis, slightly worse for the past few months due to missed Stelara injection due to insurance issues.  - Discussion of other treatment options including Humira.  - Stop Stelara 45 mg subcutaneously every 3 months.   - Start Humira. First week double dose, Second week start every other week. Reviewed biologics and potential side effects.   - Labs were obtained in Fall 2017, wnl.  - If needed, continue clobetasol 0.05% ointment - apply twice daily for two weeks, then bid on weekends.     Follow-up in 3 months, earlier for new or changing lesions.     Staff Involved:  Staff Only  Scribe Disclosure:   I, Geraldine Horne, am serving as a scribe to document services personally performed by Paige Calderón PA-C, based on data collection and the provider's statements to me.  Provider Disclosure:   The documentation recorded by the scribe accurately reflects the services I personally performed and the decisions made by me.    All risks, benefits and alternatives were discussed with patient.  Patient is in agreement and understands the assessment  and plan.  All questions were answered.  Sun Screen Education was given.   Return to Clinic in 3 months or sooner as needed.   Paige Calderón PA-C   [facility] Dermatology Clinic     Again, thank you for allowing me to participate in the care of your patient.      Sincerely,    Paige Calderón PA-C

## 2018-02-08 NOTE — TELEPHONE ENCOUNTER
Prior Authorization Approval    Authorization Effective Date: 1/8/2018  Authorization Expiration Date: 2/8/2019  Medication: adalimumab (Humira) 40mg/ 0.8mL pens   Approved Dose/Quantity: Loading dose start kit and then 40mg every other week maintenance  Reference #: CMM key# [id number]   Insurance Company: [employer] - Phone [phone] Fax [phone]  Expected CoPay:       CoPay Card Available:     - state sponsored insurance  Foundation Assistance Needed:    Which Pharmacy is filling the prescription (Not needed for infusion/clinic administered): [facility] - [place], [place] - [address]  Pharmacy Notified: Yes  Patient Notified:  Pharmacy will notify pt when filling

## 2018-02-08 NOTE — TELEPHONE ENCOUNTER
PA Initiation    Medication: adalimumab (Humira) 40mg/ 0.8mL pens  Insurance Company: [employer] - Phone [phone] Fax [phone]  Pharmacy Filling the Rx: [facility] - [place], [place] - [address]  Filling Pharmacy Phone: [phone]  Filling Pharmacy Fax: [phone]  Start Date: 2/8/2018    ** Pt has failed topicals and Stelara; Pso start kit loading dose

## 2018-03-21 DIAGNOSIS — L40.9 PSORIASIS: ICD-10-CM

## 2018-03-21 NOTE — TELEPHONE ENCOUNTER
Last visit 2-7-18  Next 5-18    Impression/Plan:  1. Plaque psoriasis, slightly worse for the past few months due to missed Stelara injection due to insurance issues.  - Discussion of other treatment options including Humira.  - Stop Stelara 45 mg subcutaneously every 3 months.   - Start Humira. First week double dose, Second week start every other week. Reviewed biologics and potential side effects.   - Labs were obtained in Fall 2017, wnl.  - If needed, continue clobetasol 0.05% ointment - apply twice daily for two weeks, then bid on weekends.      Follow-up in 3 months, earlier for new or changing lesions.

## 2018-06-06 ENCOUNTER — OFFICE VISIT (OUTPATIENT)
Dept: DERMATOLOGY | Facility: CLINIC | Age: 33
End: 2018-06-06
Payer: COMMERCIAL

## 2018-06-06 DIAGNOSIS — D18.01 CHERRY ANGIOMA: Primary | ICD-10-CM

## 2018-06-06 DIAGNOSIS — L40.9 PSORIASIS: ICD-10-CM

## 2018-06-06 ASSESSMENT — PAIN SCALES - GENERAL: PAINLEVEL: NO PAIN (0)

## 2018-06-06 NOTE — LETTER
"6/6/2018       RE: Luis M Roth  [address]  [place] [address]     Dear Colleague,    Thank you for referring your patient, Luis M Roth, to the [facility] DERMATOLOGY at [facility]. Please see a copy of my visit note below.    [facility] Dermatology Note    Dermatology Problem List:  1. Plaque psoriasis  - s/p cyclosporine, light treatment, topical steroid medications, Stelara 45 mg  - clobetasol 0.05% ointment, ketoconazole 2% shampoo, Humira    CC:   Chief Complaint   Patient presents with     Derm Problem     Psoriasis Luis M states \" It is better , the medication is working fine.\"      Date of Service: Jun 6, 2018    History of Present Illness:  Mr. Luis M Roth is a 32 year old male who presents for a follow up for his psoriasis. The patient was last seen in the dermatology clinic on 2/7/2018 during which he started Humira 40 mg every other week.    Today the patient reports that things are going very well with the Humira medication. He reports that his psoriasis has cleared. On ROS, he reports his hands occasionally feel numb for an hour or so but it eventually resolves. He also reports that he has gained some weight and has been having bowel movement issues since starting Humira.     He reports small red areas of concern on his upper left forearm and abdomen that appeared about 2-3 weeks ago. He is unsure what caused them.     Otherwise the patient reports no additional painful, bleeding, nonhealing or pruritic lesions and denies any new or changing moles.    Past Medical History:   Patient Active Problem List   Diagnosis     Psoriasis     No past medical history on file.    No past surgical history on file.    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    Family History:  Not addressed at this visit.    Medications:  Current Outpatient Prescriptions   Medication Sig Dispense Refill     " adalimumab (HUMIRA) 40 MG/0.8ML prefilled syringe kit Inject 40 mg subQ every other week. 6 Syringe 0     clobetasol (TEMOVATE) 0.05 % ointment Apply topically 2 times daily On weekends. 60 g 0     Allergies:  Allergies   Allergen Reactions     No Known Drug Allergy      Review of Systems:  - Denies headaches, blurred vision, confusion, fever, chronic cough, arthralgias, shortness of breath, abdominal pain or diarrhea, hematochezia. Is feeling well otherwise, no other skin complaints.    Physical exam:  Vitals: There were no vitals taken for this visit.  GEN: This is a well developed, well-nourished male in no acute distress, in a pleasant mood.      SKIN: Sun-exposed skin, which includes the head/face, neck, both arms, digits, and/or nails was examined. Defers further examination.   - Small psoriatic plaques on central and frontal scalp   -There are red dome shaped symmetric papules scattered on the left upper forearm  - No other lesions of concern on areas examined.     Impression/Plan:  1. Plaque psoriasis, improved on Humira   - Continue Humira. First week double dose, Second week start every other week. Reviewed biologics and potential side effects.   - Labs to be obtained at next visit   - As needed, continue clobetasol 0.05% ointment - apply twice daily for two weeks, then bid on weekends.     2. Cherry angiomas  -Reassured of benign nature    Follow-up in 6 months, earlier for new or changing lesions.     Staff Involved:  Scribe/Staff    Scribe Disclosure:   MADYSON, Francie Conti, am serving as a scribe to document services personally performed by Paige Calderón PA-C, based on data collection and the provider's statements to me.    Provider Disclosure:   The documentation recorded by the scribe accurately reflects the services I personally performed and the decisions made by me.    All risks, benefits and alternatives were discussed with patient.  Patient is in agreement and understands the assessment and  plan.  All questions were answered.  Sun Screen Education was given.   Return to Clinic in 6 months or sooner as needed.   Paige Calderón PA-C   [facility]       Again, thank you for allowing me to participate in the care of your patient.      Sincerely,    Paige Calderón PA-C

## 2018-06-06 NOTE — PROGRESS NOTES
"[facility] Dermatology Note    Dermatology Problem List:  1. Plaque psoriasis  - s/p cyclosporine, light treatment, topical steroid medications, Stelara 45 mg  - clobetasol 0.05% ointment, ketoconazole 2% shampoo, Humira    CC:   Chief Complaint   Patient presents with     Derm Problem     Psoriasis Luis M states \" It is better , the medication is working fine.\"      Date of Service: Jun 6, 2018    History of Present Illness:  Mr. Luis M Roth is a 32 year old male who presents for a follow up for his psoriasis. The patient was last seen in the dermatology clinic on 2/7/2018 during which he started Humira 40 mg every other week.    Today the patient reports that things are going very well with the Humira medication. He reports that his psoriasis has cleared. On ROS, he reports his hands occasionally feel numb for an hour or so but it eventually resolves. He also reports that he has gained some weight and has been having bowel movement issues since starting Humira.     He reports small red areas of concern on his upper left forearm and abdomen that appeared about 2-3 weeks ago. He is unsure what caused them.     Otherwise the patient reports no additional painful, bleeding, nonhealing or pruritic lesions and denies any new or changing moles.    Past Medical History:   Patient Active Problem List   Diagnosis     Psoriasis     No past medical history on file.    No past surgical history on file.    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    Family History:  Not addressed at this visit.    Medications:  Current Outpatient Prescriptions   Medication Sig Dispense Refill     adalimumab (HUMIRA) 40 MG/0.8ML prefilled syringe kit Inject 40 mg subQ every other week. 6 Syringe 0     clobetasol (TEMOVATE) 0.05 % ointment Apply topically 2 times daily On weekends. 60 g 0     Allergies:  Allergies   Allergen Reactions     No Known Drug Allergy      Review of " Systems:  - Denies headaches, blurred vision, confusion, fever, chronic cough, arthralgias, shortness of breath, abdominal pain or diarrhea, hematochezia. Is feeling well otherwise, no other skin complaints.    Physical exam:  Vitals: There were no vitals taken for this visit.  GEN: This is a well developed, well-nourished male in no acute distress, in a pleasant mood.      SKIN: Sun-exposed skin, which includes the head/face, neck, both arms, digits, and/or nails was examined. Defers further examination.   - Small psoriatic plaques on central and frontal scalp   -There are red dome shaped symmetric papules scattered on the left upper forearm  - No other lesions of concern on areas examined.     Impression/Plan:  1. Plaque psoriasis, improved on Humira   - Continue Humira. First week double dose, Second week start every other week. Reviewed biologics and potential side effects.   - Labs to be obtained at next visit   - As needed, continue clobetasol 0.05% ointment - apply twice daily for two weeks, then bid on weekends.     2. Cherry angiomas  -Reassured of benign nature    Follow-up in 6 months, earlier for new or changing lesions.     Staff Involved:  Scribe/Staff    Scribe Disclosure:   I, Francie Conti, am serving as a scribe to document services personally performed by Paige Calderón PA-C, based on data collection and the provider's statements to me.    Provider Disclosure:   The documentation recorded by the scribe accurately reflects the services I personally performed and the decisions made by me.    All risks, benefits and alternatives were discussed with patient.  Patient is in agreement and understands the assessment and plan.  All questions were answered.  Sun Screen Education was given.   Return to Clinic in 6 months or sooner as needed.   Paige Calderón PA-C   [facility]

## 2018-06-06 NOTE — NURSING NOTE
"Dermatology Rooming Note    Luis M Roth's goals for this visit include:   Chief Complaint   Patient presents with     Derm Problem     Psoriasis , Luis M states \" It is better , the medication is working fine.\"      Peace Horn LPN  "

## 2018-06-06 NOTE — LETTER
Date:June 11, 2018      Patient was self referred, no letter generated. Do not send.        [facility] Health Information

## 2018-06-06 NOTE — MR AVS SNAPSHOT
After Visit Summary   6/6/2018    Luis M Roth    MRN: [id number]           Patient Information     Date Of Birth          1985        Visit Information        Provider Department      6/6/2018 4:00 PM Paige Calderón PA-C [facility] Dermatology        Today's Diagnoses     Cherry angioma    -  1    Psoriasis           Follow-ups after your visit        Follow-up notes from your care team     Return in about 6 months (around 12/6/2018).      Who to contact     Please call your clinic at [phone] to:    Ask questions about your health    Make or cancel appointments    Discuss your medicines    Learn about your test results    Speak to your doctor            Additional Information About Your Visit        [employer] Information     [email or web address] gives you secure access to your electronic health record. If you see a primary care provider, you can also send messages to your care team and make appointments. If you have questions, please call your primary care clinic.  If you do not have a primary care provider, please call [phone] and they will assist you.      [email or web address] is an electronic gateway that provides easy, online access to your medical records. With [email or web address], you can request a clinic appointment, read your test results, renew a prescription or communicate with your care team.     To access your existing account, please contact your [facility] or call [phone] for assistance.        Care EveryWhere ID     This is your Care EveryWhere ID. This could be used by other organizations to access your [facility] medical records  [id number]         Blood Pressure from Last 3 Encounters:   11/10/17 127/72   07/05/17 107/70    Weight from Last 3 Encounters:   08/05/15 75.3 kg (166 lb)   06/11/14 71.8 kg (158 lb 6.4 oz)   04/18/13 69.7 kg (153 lb 9.6 oz)              Today, you had the following     No orders found for display         Where to get your medicines       These medications were sent to [facility] - [place], [place] - [address]  [address] , [facility] [address]    Hours:  Mon-Fri 8:30am-5:00pm Toll Free [phone] Phone:  [phone]     adalimumab 40 MG/0.8ML prefilled syringe kit          Primary Care Provider Fax #    Physician No Ref-Primary [phone]       No address on file        Equal Access to Services     KRISTAL KENT : Hadii aad ku hadasho Soomaali, waaxda luqadaha, qaybta kaalmada adeegyada, waxay idiin hayaan adesoraya kharasrinivasan lasharad . So [facility] [phone].    ATENCIÓN: Si habla español, tiene a willams disposición servicios gratuitos de asistencia lingüística. [facility] [phone].    We comply with applicable federal civil rights laws and [place] laws. We do not discriminate on the basis of race, color, national origin, age, disability, sex, sexual orientation, or gender identity.            Thank you!     Thank you for choosing [facility] DERMATOLOGY  for your care. Our goal is always to provide you with excellent care. Hearing back from our patients is one way we can continue to improve our services. Please take a few minutes to complete the written survey that you may receive in the mail after your visit with us. Thank you!             Your Updated Medication List - Protect others around you: Learn how to safely use, store and throw away your medicines at www.disposemymeds.org.          This list is accurate as of 6/6/18 11:59 PM.  Always use your most recent med list.                   Brand Name Dispense Instructions for use Diagnosis    adalimumab 40 MG/0.8ML prefilled syringe kit    HUMIRA    6 Syringe    Inject 40 mg subQ every other week.    Psoriasis       clobetasol 0.05 % ointment    TEMOVATE    60 g    Apply topically 2 times daily On weekends.    Plaque psoriasis

## 2018-12-07 ENCOUNTER — OFFICE VISIT (OUTPATIENT)
Dept: DERMATOLOGY | Facility: CLINIC | Age: 33
End: 2018-12-07
Payer: COMMERCIAL

## 2018-12-07 DIAGNOSIS — L40.0 PLAQUE PSORIASIS: ICD-10-CM

## 2018-12-07 DIAGNOSIS — Z51.81 MEDICATION MONITORING ENCOUNTER: ICD-10-CM

## 2018-12-07 DIAGNOSIS — L40.9 SCALP PSORIASIS: ICD-10-CM

## 2018-12-07 DIAGNOSIS — L40.9 SCALP PSORIASIS: Primary | ICD-10-CM

## 2018-12-07 DIAGNOSIS — L40.9 PSORIASIS: ICD-10-CM

## 2018-12-07 LAB
ALBUMIN SERPL-MCNC: 4 G/DL (ref 3.4–5)
ALP SERPL-CCNC: 44 U/L (ref 40–150)
ALT SERPL W P-5'-P-CCNC: 44 U/L (ref 0–70)
ANION GAP SERPL CALCULATED.3IONS-SCNC: 8 MMOL/L (ref 3–14)
AST SERPL W P-5'-P-CCNC: 21 U/L (ref 0–45)
BASOPHILS # BLD AUTO: 0.1 10E9/L (ref 0–0.2)
BASOPHILS NFR BLD AUTO: 0.6 %
BILIRUB SERPL-MCNC: 0.4 MG/DL (ref 0.2–1.3)
BUN SERPL-MCNC: 14 MG/DL (ref 7–30)
CALCIUM SERPL-MCNC: 8.7 MG/DL (ref 8.5–10.1)
CHLORIDE SERPL-SCNC: 106 MMOL/L (ref 94–109)
CO2 SERPL-SCNC: 24 MMOL/L (ref 20–32)
CREAT SERPL-MCNC: 0.87 MG/DL (ref 0.66–1.25)
DIFFERENTIAL METHOD BLD: NORMAL
EOSINOPHIL # BLD AUTO: 0.1 10E9/L (ref 0–0.7)
EOSINOPHIL NFR BLD AUTO: 1.6 %
ERYTHROCYTE [DISTWIDTH] IN BLOOD BY AUTOMATED COUNT: 12.3 % (ref 10–15)
GFR SERPL CREATININE-BSD FRML MDRD: >90 ML/MIN/1.7M2
GLUCOSE SERPL-MCNC: 92 MG/DL (ref 70–99)
HCT VFR BLD AUTO: 44.8 % (ref 40–53)
HGB BLD-MCNC: 15.1 G/DL (ref 13.3–17.7)
IMM GRANULOCYTES # BLD: 0 10E9/L (ref 0–0.4)
IMM GRANULOCYTES NFR BLD: 0.2 %
LYMPHOCYTES # BLD AUTO: 3 10E9/L (ref 0.8–5.3)
LYMPHOCYTES NFR BLD AUTO: 34.3 %
MCH RBC QN AUTO: 28.7 PG (ref 26.5–33)
MCHC RBC AUTO-ENTMCNC: 33.7 G/DL (ref 31.5–36.5)
MCV RBC AUTO: 85 FL (ref 78–100)
MONOCYTES # BLD AUTO: 0.9 10E9/L (ref 0–1.3)
MONOCYTES NFR BLD AUTO: 9.8 %
NEUTROPHILS # BLD AUTO: 4.7 10E9/L (ref 1.6–8.3)
NEUTROPHILS NFR BLD AUTO: 53.5 %
NRBC # BLD AUTO: 0 10*3/UL
NRBC BLD AUTO-RTO: 0 /100
PLATELET # BLD AUTO: 220 10E9/L (ref 150–450)
POTASSIUM SERPL-SCNC: 3.9 MMOL/L (ref 3.4–5.3)
PROT SERPL-MCNC: 7.8 G/DL (ref 6.8–8.8)
RBC # BLD AUTO: 5.26 10E12/L (ref 4.4–5.9)
SODIUM SERPL-SCNC: 138 MMOL/L (ref 133–144)
WBC # BLD AUTO: 8.8 10E9/L (ref 4–11)

## 2018-12-07 RX ORDER — CLOBETASOL PROPIONATE 0.05 G/100ML
SHAMPOO TOPICAL
Qty: 118 ML | Refills: 11 | Status: SHIPPED | OUTPATIENT
Start: 2018-12-07 | End: 2018-12-07

## 2018-12-07 RX ORDER — CLOBETASOL PROPIONATE 0.5 MG/ML
SOLUTION TOPICAL 2 TIMES DAILY
Qty: 50 ML | Refills: 5 | Status: SHIPPED | OUTPATIENT
Start: 2018-12-07 | End: 2018-12-11

## 2018-12-07 ASSESSMENT — PAIN SCALES - GENERAL: PAINLEVEL: NO PAIN (0)

## 2018-12-07 NOTE — PROGRESS NOTES
[facility] Dermatology Note    Dermatology Problem List:  1. Plaque psoriasis  - s/p cyclosporine, light treatment, topical steroid medications, Stelara 45 mg, clobetasol 0.05% ointment   - clobetasol 0.05% shampoo, ketoconazole 2% shampoo, Humira    CC:   Chief Complaint   Patient presents with     Derm Problem     Psoriasis followup, Luis M states he is doing well.      Date of Service: Dec 7, 2018    History of Present Illness:  Mr. Luis M Roth is a 33 year old male who presents for a follow up for his psoriasis. The patient was last seen in the dermatology clinic on 06/06/18 during which he continued Humira for his psoriasis. Today he reports that things are going well regarding his skin. The only thing bothering him at this time is his scalp. He denies noting any other persistent areas of psoriasis. He is happy with his progress on Humira at this time.    He denies any persistent joint pain or stiffness. He denies any further numbness in his fingers or toes. He denies stool changes, GI upset or vision changes. Otherwise the patient reports no additional painful, bleeding, nonhealing or pruritic lesions and denies any new or changing moles.    Past Medical History:   Patient Active Problem List   Diagnosis     Psoriasis     History reviewed. No pertinent past medical history.    History reviewed. No pertinent surgical history.    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    Family History:  Not addressed at this visit.    Medications:  Current Outpatient Prescriptions   Medication Sig Dispense Refill     adalimumab (HUMIRA) 40 MG/0.8ML prefilled syringe kit Inject 40 mg subQ every other week. 6 Syringe 2     clobetasol (TEMOVATE) 0.05 % ointment Apply topically 2 times daily On weekends. 60 g 0     Allergies:  Allergies   Allergen Reactions     No Known Drug Allergy      Review of Systems:  - Denies headaches, blurred vision, confusion, fever,  chronic cough, arthralgias, shortness of breath, abdominal pain or diarrhea, hematochezia. Is feeling well otherwise, no other skin complaints.  - Skin: As per HPI. No additional skin concerns.   - Constitutional: The patient is feeling generally well, in his usual state of health     Physical exam:  Vitals: There were no vitals taken for this visit.  GEN: This is a well developed, well-nourished male in no acute distress, in a pleasant mood.    SKIN: Sun-exposed skin, which includes the head/face, neck, both arms, digits, and/or nails was examined. Defers further examination. Significant for:   - Thin pink plaques scattered to most of the scalp, with micaceous scale  - Mild scale to eyebrows  - 2 small thin thin plaques on forehead   - No other lesions of concern on areas examined.     Impression/Plan:  1. Plaque psoriasis, improved on Humira   - Pt prefers to continue Humira at this visit. If no further improvement at follow up, will consider an alternative biologic medication (Talz, Tremfya)   - Continue Humira 40 mg every other week. Reviewed biologics and potential side effects. TB, CBC with diff and CMP  - Labs ordered at today's visit:   - As needed, continue clobetasol 0.05% ointment - apply twice daily for two weeks, then bid on weekends.   - Start clobetasol 0.05% solution (shampoo not covered), to be applied on affected areas of the scalp twice daily. Steroid ed given.   Follow-up in 3 months, earlier for new or changing symptoms     Staff Involved:  Scribe/Staff    Scribe Disclosure:   MADYSON, Francie Conti, am serving as a scribe to document services personally performed by Paige Calderón PA-C, based on data collection and the provider's statements to me.    Provider Disclosure:   The documentation recorded by the scribe accurately reflects the services I personally performed and the decisions made by me.    All risks, benefits and alternatives were discussed with patient.  Patient is in agreement and  understands the assessment and plan.  All questions were answered.  Sun Screen Education was given.   Return to Clinic in 3 months or sooner as needed.   Paige Calderón PA-C   [facility]

## 2018-12-07 NOTE — LETTER
Date:December 10, 2018      Patient was self referred, no letter generated. Do not send.        [facility] Health Information

## 2018-12-07 NOTE — LETTER
12/7/2018       RE: Luis M Roth  [address]  [place] [address]     Dear Colleague,    Thank you for referring your patient, Luis M Roth, to the [facility] DERMATOLOGY at [facility]. Please see a copy of my visit note below.    [facility] Dermatology Note    Dermatology Problem List:  1. Plaque psoriasis  - s/p cyclosporine, light treatment, topical steroid medications, Stelara 45 mg, clobetasol 0.05% ointment   - clobetasol 0.05% shampoo, ketoconazole 2% shampoo, Humira    CC:   Chief Complaint   Patient presents with     Derm Problem     Psoriasis followup, Luis M states he is doing well.      Date of Service: Dec 7, 2018    History of Present Illness:  Mr. Luis M Roth is a 33 year old male who presents for a follow up for his psoriasis. The patient was last seen in the dermatology clinic on 06/06/18 during which he continued Humira for his psoriasis. Today he reports that things are going well regarding his skin. The only thing bothering him at this time is his scalp. He denies noting any other persistent areas of psoriasis. He is happy with his progress on Humira at this time.    He denies any persistent joint pain or stiffness. He denies any further numbness in his fingers or toes. He denies stool changes, GI upset or vision changes. Otherwise the patient reports no additional painful, bleeding, nonhealing or pruritic lesions and denies any new or changing moles.    Past Medical History:   Patient Active Problem List   Diagnosis     Psoriasis     History reviewed. No pertinent past medical history.    History reviewed. No pertinent surgical history.    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    Family History:  Not addressed at this visit.    Medications:  Current Outpatient Prescriptions   Medication Sig Dispense Refill     adalimumab (HUMIRA) 40 MG/0.8ML prefilled syringe kit Inject  40 mg subQ every other week. 6 Syringe 2     clobetasol (TEMOVATE) 0.05 % ointment Apply topically 2 times daily On weekends. 60 g 0     Allergies:  Allergies   Allergen Reactions     No Known Drug Allergy      Review of Systems:  - Denies headaches, blurred vision, confusion, fever, chronic cough, arthralgias, shortness of breath, abdominal pain or diarrhea, hematochezia. Is feeling well otherwise, no other skin complaints.  - Skin: As per HPI. No additional skin concerns.   - Constitutional: The patient is feeling generally well, in his usual state of health     Physical exam:  Vitals: There were no vitals taken for this visit.  GEN: This is a well developed, well-nourished male in no acute distress, in a pleasant mood.    SKIN: Sun-exposed skin, which includes the head/face, neck, both arms, digits, and/or nails was examined. Defers further examination. Significant for:   - Thin pink plaques scattered to most of the scalp, with micaceous scale  - Mild scale to eyebrows  - 2 small thin thin plaques on forehead   - No other lesions of concern on areas examined.     Impression/Plan:  1. Plaque psoriasis, improved on Humira   - Pt prefers to continue Humira at this visit. If no further improvement at follow up, will consider an alternative biologic medication (Talz, Tremfya)   - Continue Humira 40 mg every other week. Reviewed biologics and potential side effects. TB, CBC with diff and CMP  - Labs ordered at today's visit:   - As needed, continue clobetasol 0.05% ointment - apply twice daily for two weeks, then bid on weekends.   - Start clobetasol 0.05% solution (shampoo not covered), to be applied on affected areas of the scalp twice daily. Steroid ed given.   Follow-up in 3 months, earlier for new or changing symptoms     Staff Involved:  Scribe/Staff    Scribe Disclosure:   Francie COUGHLIN, am serving as a scribe to document services personally performed by Paige Calderón PA-C, based on data collection and the  provider's statements to me.    Provider Disclosure:   The documentation recorded by the scribe accurately reflects the services I personally performed and the decisions made by me.    All risks, benefits and alternatives were discussed with patient.  Patient is in agreement and understands the assessment and plan.  All questions were answered.  Sun Screen Education was given.   Return to Clinic in 3 months or sooner as needed.   Paige Calderón PA-C   [facility]       Again, thank you for allowing me to participate in the care of your patient.      Sincerely,    Paige Calderón PA-C

## 2018-12-07 NOTE — NURSING NOTE
Dermatology Rooming Note    Luis M Roth's goals for this visit include:   Chief Complaint   Patient presents with     Derm Problem     Psoriasis followup, Luis M states he is doing well.      Peace Horn LPN

## 2018-12-07 NOTE — MR AVS SNAPSHOT
After Visit Summary   12/7/2018    Luis M Roth    MRN: [id number]           Patient Information     Date Of Birth          1985        Visit Information        Provider Department      12/7/2018 2:30 PM Paige Calderón PA-C [facility]        Today's Diagnoses     Scalp psoriasis    -  1       Follow-ups after your visit        Your next 10 appointments already scheduled     Dec 07, 2018  2:45 PM CST   LAB with  LAB   [facility] ([facility])    [address] [place] [id number] [phone]           Please do not eat 10-12 hours before your appointment if you are coming in fasting for labs on lipids, cholesterol, or glucose (sugar). This does not apply to pregnant women. Water, hot tea and black coffee (with nothing added) are okay. Do not drink other fluids, diet soda or chew gum.            Mar 08, 2019  2:30 PM CST   (Arrive by 2:15 PM)   Return Visit with COURTNEY Childers [facility] ([facility])    [address] [place] [address] [phone]              Future tests that were ordered for you today     Open Future Orders        Priority Expected Expires Ordered    M Tuberculosis by Quantiferon Routine  12/7/2019 12/7/2018            Who to contact     Please call your clinic at [phone] to:    Ask questions about your health    Make or cancel appointments    Discuss your medicines    Learn about your test results    Speak to your doctor            Additional Information About Your Visit        Wiztangohart Information     [employer]Hub gives you secure access to your electronic health record. If you see a primary care provider, you can also send messages to your care team and make appointments. If you have questions, please call your primary care clinic.  If you do not have a primary care provider, please call [phone] and they will assist  you.      AimWith is an electronic gateway that provides easy, online access to your medical records. With AimWith, you can request a clinic appointment, read your test results, renew a prescription or communicate with your care team.     To access your existing account, please contact your [facility] or call [phone] for assistance.        Care EveryWhere ID     This is your Care EveryWhere ID. This could be used by other organizations to access your [facility] medical records  [id number]         Blood Pressure from Last 3 Encounters:   11/10/17 127/72   07/05/17 107/70    Weight from Last 3 Encounters:   08/05/15 75.3 kg (166 lb)   06/11/14 71.8 kg (158 lb 6.4 oz)   04/18/13 69.7 kg (153 lb 9.6 oz)                 Today's Medication Changes          These changes are accurate as of 12/7/18  2:38 PM.  If you have any questions, ask your nurse or doctor.               These medicines have changed or have updated prescriptions.        Dose/Directions    * clobetasol 0.05 % external ointment   Commonly known as:  TEMOVATE   This may have changed:  Another medication with the same name was added. Make sure you understand how and when to take each.   Used for:  Plaque psoriasis   Changed by:  Paige Calderón PA-C        Apply topically 2 times daily On weekends.   Quantity:  60 g   Refills:  0       * clobetasol propionate 0.05 % external shampoo   Commonly known as:  CLOBEX   This may have changed:  You were already taking a medication with the same name, and this prescription was added. Make sure you understand how and when to take each.   Used for:  Scalp psoriasis   Changed by:  Paige Calderón PA-C        Apply topically five times a week To dry scalp x 15 min and rinse.   Quantity:  118 mL   Refills:  11       * Notice:  This list has 2 medication(s) that are the same as other medications prescribed for you. Read the directions carefully, and ask your doctor or  other care provider to review them with you.         Where to get your medicines      These medications were sent to [place], [place] - [address]  [address], [facility] [address]    Hours:  TRANSPLANT PHONE NUMBER [phone] Phone:  [phone]     clobetasol propionate 0.05 % external shampoo                Primary Care Provider Fax #    Physician No Ref-Primary [phone]       No address on file        Equal Access to Services     KRISTAL KENT : Hadii aad ku hadasho Soomaali, waaxda luqadaha, qaybta kaalmada adeegyada, waxay idiin hayaan adeeg nadien winter . So [facility] [phone].    ATENCIÓN: Si habla español, tiene a willams disposición servicios gratuitos de asistencia lingüística. [facility] [phone].    We comply with applicable federal civil rights laws and [place] laws. We do not discriminate on the basis of race, color, national origin, age, disability, sex, sexual orientation, or gender identity.            Thank you!     Thank you for choosing [facility] DERMATOLOG[facility]  for your care. Our goal is always to provide you with excellent care. Hearing back from our patients is one way we can continue to improve our services. Please take a few minutes to complete the written survey that you may receive in the mail after your visit with us. Thank you!             Your Updated Medication List - Protect others around you: Learn how to safely use, store and throw away your medicines at www.disposemymeds.org.          This list is accurate as of 12/7/18  2:38 PM.  Always use your most recent med list.                   Brand Name Dispense Instructions for use Diagnosis    adalimumab 40 MG/0.8ML prefilled syringe kit    HUMIRA    6 Syringe    Inject 40 mg subQ every other week.    Psoriasis       * clobetasol 0.05 % external ointment    TEMOVATE    60 g    Apply topically 2 times daily On weekends.    Plaque psoriasis       * clobetasol  propionate 0.05 % external shampoo    CLOBEX    118 mL    Apply topically five times a week To dry scalp x 15 min and rinse.    Scalp psoriasis       * Notice:  This list has 2 medication(s) that are the same as other medications prescribed for you. Read the directions carefully, and ask your doctor or other care provider to review them with you.

## 2018-12-10 LAB
GAMMA INTERFERON BACKGROUND BLD IA-ACNC: 0.03 IU/ML
M TB IFN-G BLD-IMP: NEGATIVE
M TB IFN-G CD4+ BCKGRND COR BLD-ACNC: 8.75 IU/ML
MITOGEN IGNF BCKGRD COR BLD-ACNC: 0 IU/ML
MITOGEN IGNF BCKGRD COR BLD-ACNC: 0 IU/ML

## 2018-12-11 ENCOUNTER — TELEPHONE (OUTPATIENT)
Dept: DERMATOLOGY | Facility: CLINIC | Age: 33
End: 2018-12-11

## 2018-12-11 DIAGNOSIS — L40.9 SCALP PSORIASIS: ICD-10-CM

## 2018-12-11 RX ORDER — CLOBETASOL PROPIONATE 0.5 MG/ML
SOLUTION TOPICAL 2 TIMES DAILY
Qty: 50 ML | Refills: 5 | Status: SHIPPED | OUTPATIENT
Start: 2018-12-11

## 2018-12-11 NOTE — TELEPHONE ENCOUNTER
[facility] Call [facility]    Phone Message    May a detailed message be left on voicemail: yes    Reason for Call: Other: [place] [facility]- advised that the clobetasol shampoo isnt covered, and the solution is, if the solution is ok for pt- please let the pharmacy know- otherwise please do a prior auth for the shampoo, thanks     Action Taken: Message routed to:  [facility] ([facility]

## 2019-02-27 ENCOUNTER — TELEPHONE (OUTPATIENT)
Dept: DERMATOLOGY | Facility: CLINIC | Age: 34
End: 2019-02-27

## 2019-02-27 NOTE — TELEPHONE ENCOUNTER
PA Initiation    Medication: humira  Insurance Company: [employer] - Phone [phone] Fax [phone]  Pharmacy Filling the Rx: [facility] - [place], [place] - [facility] [facility] [address]  Filling Pharmacy Phone: [phone]  Filling Pharmacy Fax: [phone]  Start Date: 2/27/2019

## 2019-02-28 NOTE — TELEPHONE ENCOUNTER
Prior Authorization Approval    Authorization Effective Date: 1/27/2019  Authorization Expiration Date: 2/27/2021  Medication: humira approved  Approved Dose/Quantity: 2 for 28 days  Reference #: [id number]   Insurance Company: [employer] - Phone [phone] Fax [phone]  Expected CoPay: $0     CoPay Card Available:      Foundation Assistance Needed:    Which Pharmacy is filling the prescription (Not needed for infusion/clinic administered): [facility] - [place], [place] - [address]  Pharmacy Notified: Yes  Patient Notified: Yes

## 2019-03-08 ENCOUNTER — OFFICE VISIT (OUTPATIENT)
Dept: DERMATOLOGY | Facility: CLINIC | Age: 34
End: 2019-03-08
Payer: COMMERCIAL

## 2019-03-08 DIAGNOSIS — L40.9 PSORIASIS: Primary | ICD-10-CM

## 2019-03-08 ASSESSMENT — PAIN SCALES - GENERAL: PAINLEVEL: NO PAIN (0)

## 2019-03-08 NOTE — LETTER
"3/8/2019       RE: Luis M Roth  [address]  [place] [address]     Dear Colleague,    Thank you for referring your patient, Luis M Roth, to the [facility] DERMATOLOG[facility] at [facility]. Please see a copy of my visit note below.    [facility] Dermatology Note      Dermatology Problem List:  1. Plaque psoriasis  - s/p cyclosporine, light treatment, topical steroid medications, Stelara 45 mg, clobetasol 0.05% ointment, Humira  - clobetasol 0.05%  solution, ketoconazole 2% shampoo, Consentyx   -s/pcoal tar shampoo, Derma-Smoothe/FS oil, hydroxyzine at bedtime for itching, triamcinolone    Encounter Date: Mar 8, 2019    CC:  Chief Complaint   Patient presents with     Derm Problem     Luis M Virk \" It is okay.\"          History of Present Illness:  Mr. Luis M Roth is a 33 year old male who presents today to follow up on psoriasis treatment. The patient was last seen on 12/7/18 when he continued clobetasol 0.05% ointment and Humira 40 mg every other week. He also started clobetasol 0.05% solution at that time. Today he reports that his skin seems to be getting worse. He is not seeing improvement with Humira like he did initially. He has pitting in most of his fingernails. He feels ready to try another medication. There are not any specific ones that he wanted to try. The patient is otherwise feeling well. There are no other skin concerns at this time.      Past Medical History:   Patient Active Problem List   Diagnosis     Psoriasis     No past medical history on file.  No past surgical history on file.    Social History:  Social History     Socioeconomic History     Marital status: Single     Spouse name: Not on file     Number of children: Not on file     Years of education: Not on file     Highest education level: Not on file   Occupational History     Not on file   Social Needs     Financial resource strain: Not on file     Food " insecurity:     Worry: Not on file     Inability: Not on file     Transportation needs:     Medical: Not on file     Non-medical: Not on file   Tobacco Use     Smoking status: Former Smoker     Smokeless tobacco: Never Used     Tobacco comment: Not wanting to quit yet.   Substance and Sexual Activity     Alcohol use: Not on file     Drug use: Not on file     Sexual activity: Not on file   Lifestyle     Physical activity:     Days per week: Not on file     Minutes per session: Not on file     Stress: Not on file   Relationships     Social connections:     Talks on phone: Not on file     Gets together: Not on file     Attends Orthodox service: Not on file     Active member of club or organization: Not on file     Attends meetings of clubs or organizations: Not on file     Relationship status: Not on file     Intimate partner violence:     Fear of current or ex partner: Not on file     Emotionally abused: Not on file     Physically abused: Not on file     Forced sexual activity: Not on file   Other Topics Concern     Parent/sibling w/ CABG, MI or angioplasty before 65F 55M? Not Asked   Social History Narrative     Not on file       Family History:  Family History   Problem Relation Age of Onset     Cancer No family hx of         No family history of skin cancer     Melanoma No family hx of      Skin Cancer No family hx of        Medications:  Current Outpatient Medications   Medication Sig Dispense Refill     adalimumab (HUMIRA) 40 MG/0.8ML prefilled syringe kit Inject 40 mg subQ every other week. 2 Syringe 3     clobetasol (TEMOVATE) 0.05 % external solution Apply topically 2 times daily To affected areas on the scalp until symptoms resolve. 50 mL 5     clobetasol (TEMOVATE) 0.05 % ointment Apply topically 2 times daily On weekends. 60 g 0       Allergies   Allergen Reactions     No Known Drug Allergy        Review of Systems:  - Denies headaches, blurred vision, confusion, fever, chronic cough, arthralgias,  shortness of breath, abdominal pain or diarrhea, hematochezia. Is feeling well otherwise, no other skin complaints.  - Skin: As per HPI. No additional skin concerns.   - Constitutional: The patient is feeling generally well, in his usual state of health     Physical exam:  Vitals: There were no vitals taken for this visit.  GEN: This is a well developed, well-nourished male in no acute distress, in a pleasant mood.    SKIN: Sun-exposed skin, which includes the head/face, neck, both arms, digits, and/or nails was examined. He defers further exam.   - Thin pink plaques scattered to most of the scalp, with micaceous scale  - Mild scale to eyebrows  - Pink papules on forehead.   - Pitting in most of the fingernails.   - No other lesions of concern on areas examined.       Impression/Plan:  1. Plaque psoriasis    Discontinue Humira as no further improvement was seen.     Start Consentyx 300 mg subcutaneously for the next 5 weeks, then transition to 300 mg every 4 weeks. Reviewed biologics and potential side effects.     Continue clobetasol 0.05% solution (shampoo not covered) - apply on affected areas of the scalp twice daily.     Continue clobetasol 0.05% ointment - apply twice daily for two weeks, then bid on weekends.     His blood work is up to date, CBC, CMP and TB screening done 12/7/18.    Follow-up in 3 months, earlier for new or changing lesions.       Staff Involved:  Staff Only    Scribe Disclosure:  I, Jatin Crum, am serving as a scribe to document services personally performed by Paige Calderón PA-C, based on data collection and the provider's statements to me.     Provider Disclosure:   The documentation recorded by the scribe accurately reflects the services I personally performed and the decisions made by me.    All risks, benefits and alternatives were discussed with patient.  Patient is in agreement and understands the assessment and plan.  All questions were answered.  Sun Screen  Education was given.   Return to Clinic in 3 months or sooner as needed.   Paige Calderón PA-C   [facility]           Again, thank you for allowing me to participate in the care of your patient.      Sincerely,    Paige Calderón PA-C

## 2019-03-08 NOTE — PROGRESS NOTES
"[facility] Dermatology Note      Dermatology Problem List:  1. Plaque psoriasis  - s/p cyclosporine, light treatment, topical steroid medications, Stelara 45 mg, clobetasol 0.05% ointment, Humira  - clobetasol 0.05% solution, ketoconazole 2% shampoo, Consentyx   -s/pcoal tar shampoo, Derma-Smoothe/FS oil, hydroxyzine at bedtime for itching, triamcinolone    Encounter Date: Mar 8, 2019    CC:  Chief Complaint   Patient presents with     Derm Problem     Luis M Virk \" It is okay.\"          History of Present Illness:  Mr. Luis M Roth is a 33 year old male who presents today to follow up on psoriasis treatment. The patient was last seen on 12/7/18 when he continued clobetasol 0.05% ointment and Humira 40 mg every other week. He also started clobetasol 0.05% solution at that time. Today he reports that his skin seems to be getting worse. He is not seeing improvement with Humira like he did initially. He has pitting in most of his fingernails. He feels ready to try another medication. There are not any specific ones that he wanted to try. The patient is otherwise feeling well. There are no other skin concerns at this time.      Past Medical History:   Patient Active Problem List   Diagnosis     Psoriasis     No past medical history on file.  No past surgical history on file.    Social History:  Social History     Socioeconomic History     Marital status: Single     Spouse name: Not on file     Number of children: Not on file     Years of education: Not on file     Highest education level: Not on file   Occupational History     Not on file   Social Needs     Financial resource strain: Not on file     Food insecurity:     Worry: Not on file     Inability: Not on file     Transportation needs:     Medical: Not on file     Non-medical: Not on file   Tobacco Use     Smoking status: Former Smoker     Smokeless tobacco: Never Used     Tobacco comment: Not wanting to quit yet.   Substance and " Sexual Activity     Alcohol use: Not on file     Drug use: Not on file     Sexual activity: Not on file   Lifestyle     Physical activity:     Days per week: Not on file     Minutes per session: Not on file     Stress: Not on file   Relationships     Social connections:     Talks on phone: Not on file     Gets together: Not on file     Attends Spiritism service: Not on file     Active member of club or organization: Not on file     Attends meetings of clubs or organizations: Not on file     Relationship status: Not on file     Intimate partner violence:     Fear of current or ex partner: Not on file     Emotionally abused: Not on file     Physically abused: Not on file     Forced sexual activity: Not on file   Other Topics Concern     Parent/sibling w/ CABG, MI or angioplasty before 65F 55M? Not Asked   Social History Narrative     Not on file       Family History:  Family History   Problem Relation Age of Onset     Cancer No family hx of         No family history of skin cancer     Melanoma No family hx of      Skin Cancer No family hx of        Medications:  Current Outpatient Medications   Medication Sig Dispense Refill     adalimumab (HUMIRA) 40 MG/0.8ML prefilled syringe kit Inject 40 mg subQ every other week. 2 Syringe 3     clobetasol (TEMOVATE) 0.05 % external solution Apply topically 2 times daily To affected areas on the scalp until symptoms resolve. 50 mL 5     clobetasol (TEMOVATE) 0.05 % ointment Apply topically 2 times daily On weekends. 60 g 0       Allergies   Allergen Reactions     No Known Drug Allergy        Review of Systems:  - Denies headaches, blurred vision, confusion, fever, chronic cough, arthralgias, shortness of breath, abdominal pain or diarrhea, hematochezia. Is feeling well otherwise, no other skin complaints.  - Skin: As per HPI. No additional skin concerns.   - Constitutional: The patient is feeling generally well, in his usual state of health     Physical exam:  Vitals: There were  no vitals taken for this visit.  GEN: This is a well developed, well-nourished male in no acute distress, in a pleasant mood.    SKIN: Sun-exposed skin, which includes the head/face, neck, both arms, digits, and/or nails was examined. He defers further exam.   - Thin pink plaques scattered to most of the scalp, with micaceous scale  - Mild scale to eyebrows  - Pink papules on forehead.   - Pitting in most of the fingernails.   - No other lesions of concern on areas examined.       Impression/Plan:  1. Plaque psoriasis    Discontinue Humira as no further improvement was seen.     Start Consentyx 300 mg subcutaneously for the next 5 weeks, then transition to 300 mg every 4 weeks. Reviewed biologics and potential side effects.     Continue clobetasol 0.05% solution (shampoo not covered) - apply on affected areas of the scalp twice daily.     Continue clobetasol 0.05% ointment - apply twice daily for two weeks, then bid on weekends.     His blood work is up to date, CBC, CMP and TB screening done 12/7/18.    Follow-up in 3 months, earlier for new or changing lesions.       Staff Involved:  Staff Only    Scribe Disclosure:  I, Jatin Crum, am serving as a scribe to document services personally performed by Paige Calderón PA-C, based on data collection and the provider's statements to me.     Provider Disclosure:   The documentation recorded by the scribe accurately reflects the services I personally performed and the decisions made by me.    All risks, benefits and alternatives were discussed with patient.  Patient is in agreement and understands the assessment and plan.  All questions were answered.  Sun Screen Education was given.   Return to Clinic in 3 months or sooner as needed.   Paige Calderón PA-C   [facility]

## 2019-03-08 NOTE — LETTER
Date:March 12, 2019      Patient was self referred, no letter generated. Do not send.        [facility] Information

## 2019-03-08 NOTE — NURSING NOTE
"Dermatology Rooming Note    Luis M Roth's goals for this visit include:   Chief Complaint   Patient presents with     Derm Problem     Psoriasis, Luis M ferguson \" It is okay.\"      Peace Horn LPN   "

## 2019-03-09 ENCOUNTER — TELEPHONE (OUTPATIENT)
Dept: DERMATOLOGY | Facility: CLINIC | Age: 34
End: 2019-03-09

## 2019-03-11 ENCOUNTER — TELEPHONE (OUTPATIENT)
Dept: DERMATOLOGY | Facility: CLINIC | Age: 34
End: 2019-03-11

## 2019-03-11 NOTE — TELEPHONE ENCOUNTER
PA Initiation    Medication: Cosentyx 150mg/ mL Sensoready pens  Insurance Company: [employer] - Phone [phone] Fax [phone]  Pharmacy Filling the Rx: [facility] - [place], [place] - [address]  Filling Pharmacy Phone: [phone]  Filling Pharmacy Fax:    Start Date: 3/11/2019    ** Change from Humira to Cosentyx (also previously failed Stelara)

## 2019-03-11 NOTE — TELEPHONE ENCOUNTER
Prior Authorization Approval    Authorization Effective Date: 2/11/2019  Authorization Expiration Date: 3/11/2021  Medication: Cosentyx 150mg/ mL Sensoready pens   Approved Dose/Quantity: 300mg (2 pens) weekly for 1st 5 weeks (loading dose) and then 300mg every 4 weeks thereafter (maintenance)  Reference #: CMM key# [id number]   Insurance Company: [employer] - Phone [phone] Fax [phone]  Expected CoPay: $0     CoPay Card Available: No    Foundation Assistance Needed:    Which Pharmacy is filling the prescription (Not needed for infusion/clinic administered): [facility] - [place], [place] - [address]  Pharmacy Notified: Yes  Patient Notified:

## 2019-03-11 NOTE — TELEPHONE ENCOUNTER
[facility] Call Center    Phone Message    May a detailed message be left on voicemail: yes    Reason for Call: Other: per [place] pharmacy- wondering if pt will ever be on maintenance dose- please call pharmacy thanks     Action Taken: Message routed to:  [facility] ([facility]): derm

## 2019-03-11 NOTE — TELEPHONE ENCOUNTER
Impression/Plan:  1. Plaque psoriasis    Discontinue Humira as no further improvement was seen.     Start Consentyx 300 mg subcutaneously for the next 5 weeks, then transition to 300 mg every 4 weeks. Reviewed biologics and potential side effects.      Follow-up in 3 months, earlier for new or changing lesions.   I called the pharmacist and I gave the information above.   Elizabeth Sarmiento, CMA

## 2019-03-12 NOTE — TELEPHONE ENCOUNTER
Maintenance dosing info given to pharmacy (300mg every 4 weeks). Pt has 1st order set up with ship date of 3/13 from [facility].

## 2020-02-24 ENCOUNTER — HEALTH MAINTENANCE LETTER (OUTPATIENT)
Age: 35
End: 2020-02-24

## 2020-12-13 ENCOUNTER — HEALTH MAINTENANCE LETTER (OUTPATIENT)
Age: 35
End: 2020-12-13

## 2021-04-17 ENCOUNTER — HEALTH MAINTENANCE LETTER (OUTPATIENT)
Age: 36
End: 2021-04-17

## 2021-09-26 ENCOUNTER — HEALTH MAINTENANCE LETTER (OUTPATIENT)
Age: 36
End: 2021-09-26

## 2022-05-08 ENCOUNTER — HEALTH MAINTENANCE LETTER (OUTPATIENT)
Age: 37
End: 2022-05-08

## 2023-01-14 ENCOUNTER — HEALTH MAINTENANCE LETTER (OUTPATIENT)
Age: 38
End: 2023-01-14

## 2023-06-02 ENCOUNTER — HEALTH MAINTENANCE LETTER (OUTPATIENT)
Age: 38
End: 2023-06-02

## 2024-01-26 ENCOUNTER — LAB REQUISITION (OUTPATIENT)
Dept: LAB | Facility: CLINIC | Age: 39
End: 2024-01-26
Payer: COMMERCIAL

## 2024-01-26 DIAGNOSIS — Z79.899 OTHER LONG TERM (CURRENT) DRUG THERAPY: ICD-10-CM

## 2024-01-26 PROCEDURE — 86481 TB AG RESPONSE T-CELL SUSP: CPT | Mod: ORL | Performed by: DERMATOLOGY

## 2024-01-28 LAB
GAMMA INTERFERON BACKGROUND BLD IA-ACNC: 0.01 IU/ML
M TB IFN-G BLD-IMP: NEGATIVE
M TB IFN-G CD4+ BCKGRND COR BLD-ACNC: 4.01 IU/ML
MITOGEN IGNF BCKGRD COR BLD-ACNC: 0.02 IU/ML
MITOGEN IGNF BCKGRD COR BLD-ACNC: 0.03 IU/ML
QUANTIFERON MITOGEN: 4.02 IU/ML
QUANTIFERON NIL TUBE: 0.01 IU/ML
QUANTIFERON TB1 TUBE: 0.04 IU/ML
QUANTIFERON TB2 TUBE: 0.03